# Patient Record
Sex: FEMALE | Race: OTHER | HISPANIC OR LATINO | URBAN - METROPOLITAN AREA
[De-identification: names, ages, dates, MRNs, and addresses within clinical notes are randomized per-mention and may not be internally consistent; named-entity substitution may affect disease eponyms.]

---

## 2021-12-17 ENCOUNTER — EMERGENCY (EMERGENCY)
Facility: HOSPITAL | Age: 66
LOS: 1 days | Discharge: HOME | End: 2021-12-17
Attending: EMERGENCY MEDICINE | Admitting: EMERGENCY MEDICINE
Payer: MEDICARE

## 2021-12-17 VITALS
TEMPERATURE: 97 F | OXYGEN SATURATION: 98 % | RESPIRATION RATE: 18 BRPM | HEIGHT: 61 IN | HEART RATE: 83 BPM | WEIGHT: 197.98 LBS | DIASTOLIC BLOOD PRESSURE: 64 MMHG | SYSTOLIC BLOOD PRESSURE: 117 MMHG

## 2021-12-17 VITALS
SYSTOLIC BLOOD PRESSURE: 106 MMHG | DIASTOLIC BLOOD PRESSURE: 68 MMHG | RESPIRATION RATE: 16 BRPM | HEART RATE: 78 BPM | TEMPERATURE: 97 F | OXYGEN SATURATION: 97 %

## 2021-12-17 DIAGNOSIS — I10 ESSENTIAL (PRIMARY) HYPERTENSION: ICD-10-CM

## 2021-12-17 DIAGNOSIS — Z88.8 ALLERGY STATUS TO OTHER DRUGS, MEDICAMENTS AND BIOLOGICAL SUBSTANCES: ICD-10-CM

## 2021-12-17 DIAGNOSIS — M79.604 PAIN IN RIGHT LEG: ICD-10-CM

## 2021-12-17 DIAGNOSIS — E78.5 HYPERLIPIDEMIA, UNSPECIFIED: ICD-10-CM

## 2021-12-17 DIAGNOSIS — Z79.82 LONG TERM (CURRENT) USE OF ASPIRIN: ICD-10-CM

## 2021-12-17 DIAGNOSIS — Z98.89 OTHER SPECIFIED POSTPROCEDURAL STATES: Chronic | ICD-10-CM

## 2021-12-17 DIAGNOSIS — Z20.822 CONTACT WITH AND (SUSPECTED) EXPOSURE TO COVID-19: ICD-10-CM

## 2021-12-17 DIAGNOSIS — M79.605 PAIN IN LEFT LEG: ICD-10-CM

## 2021-12-17 LAB
ALBUMIN SERPL ELPH-MCNC: 4.4 G/DL — SIGNIFICANT CHANGE UP (ref 3.3–5)
ALBUMIN SERPL ELPH-MCNC: 4.7 G/DL — SIGNIFICANT CHANGE UP (ref 3.3–5)
ALP SERPL-CCNC: 88 U/L — SIGNIFICANT CHANGE UP (ref 40–120)
ALP SERPL-CCNC: 94 U/L — SIGNIFICANT CHANGE UP (ref 40–120)
ALT FLD-CCNC: 16 U/L — SIGNIFICANT CHANGE UP (ref 10–45)
ALT FLD-CCNC: SIGNIFICANT CHANGE UP (ref 10–45)
ANION GAP SERPL CALC-SCNC: 11 MMOL/L — SIGNIFICANT CHANGE UP (ref 5–17)
ANION GAP SERPL CALC-SCNC: 13 MMOL/L — SIGNIFICANT CHANGE UP (ref 5–17)
AST SERPL-CCNC: 17 U/L — SIGNIFICANT CHANGE UP (ref 10–40)
AST SERPL-CCNC: SIGNIFICANT CHANGE UP (ref 10–40)
BASOPHILS # BLD AUTO: 0.03 K/UL — SIGNIFICANT CHANGE UP (ref 0–0.2)
BASOPHILS NFR BLD AUTO: 0.6 % — SIGNIFICANT CHANGE UP (ref 0–2)
BILIRUB SERPL-MCNC: 0.4 MG/DL — SIGNIFICANT CHANGE UP (ref 0.2–1.2)
BILIRUB SERPL-MCNC: 0.4 MG/DL — SIGNIFICANT CHANGE UP (ref 0.2–1.2)
BUN SERPL-MCNC: 14 MG/DL — SIGNIFICANT CHANGE UP (ref 7–23)
BUN SERPL-MCNC: 16 MG/DL — SIGNIFICANT CHANGE UP (ref 7–23)
CALCIUM SERPL-MCNC: 10 MG/DL — SIGNIFICANT CHANGE UP (ref 8.4–10.5)
CALCIUM SERPL-MCNC: 9.8 MG/DL — SIGNIFICANT CHANGE UP (ref 8.4–10.5)
CHLORIDE SERPL-SCNC: 101 MMOL/L — SIGNIFICANT CHANGE UP (ref 96–108)
CHLORIDE SERPL-SCNC: 103 MMOL/L — SIGNIFICANT CHANGE UP (ref 96–108)
CO2 SERPL-SCNC: 26 MMOL/L — SIGNIFICANT CHANGE UP (ref 22–31)
CO2 SERPL-SCNC: 27 MMOL/L — SIGNIFICANT CHANGE UP (ref 22–31)
CREAT SERPL-MCNC: 0.65 MG/DL — SIGNIFICANT CHANGE UP (ref 0.5–1.3)
CREAT SERPL-MCNC: 0.73 MG/DL — SIGNIFICANT CHANGE UP (ref 0.5–1.3)
EOSINOPHIL # BLD AUTO: 0.04 K/UL — SIGNIFICANT CHANGE UP (ref 0–0.5)
EOSINOPHIL NFR BLD AUTO: 0.7 % — SIGNIFICANT CHANGE UP (ref 0–6)
GLUCOSE SERPL-MCNC: 111 MG/DL — HIGH (ref 70–99)
GLUCOSE SERPL-MCNC: 96 MG/DL — SIGNIFICANT CHANGE UP (ref 70–99)
HCT VFR BLD CALC: 37.9 % — SIGNIFICANT CHANGE UP (ref 34.5–45)
HGB BLD-MCNC: 11.9 G/DL — SIGNIFICANT CHANGE UP (ref 11.5–15.5)
IMM GRANULOCYTES NFR BLD AUTO: 0.2 % — SIGNIFICANT CHANGE UP (ref 0–1.5)
LYMPHOCYTES # BLD AUTO: 1.25 K/UL — SIGNIFICANT CHANGE UP (ref 1–3.3)
LYMPHOCYTES # BLD AUTO: 23.3 % — SIGNIFICANT CHANGE UP (ref 13–44)
MAGNESIUM SERPL-MCNC: 2 MG/DL — SIGNIFICANT CHANGE UP (ref 1.6–2.6)
MCHC RBC-ENTMCNC: 29.2 PG — SIGNIFICANT CHANGE UP (ref 27–34)
MCHC RBC-ENTMCNC: 31.4 GM/DL — LOW (ref 32–36)
MCV RBC AUTO: 93.1 FL — SIGNIFICANT CHANGE UP (ref 80–100)
MONOCYTES # BLD AUTO: 0.36 K/UL — SIGNIFICANT CHANGE UP (ref 0–0.9)
MONOCYTES NFR BLD AUTO: 6.7 % — SIGNIFICANT CHANGE UP (ref 2–14)
NEUTROPHILS # BLD AUTO: 3.67 K/UL — SIGNIFICANT CHANGE UP (ref 1.8–7.4)
NEUTROPHILS NFR BLD AUTO: 68.5 % — SIGNIFICANT CHANGE UP (ref 43–77)
NRBC # BLD: 0 /100 WBCS — SIGNIFICANT CHANGE UP (ref 0–0)
PHOSPHATE SERPL-MCNC: 3.7 MG/DL — SIGNIFICANT CHANGE UP (ref 2.5–4.5)
PLATELET # BLD AUTO: 341 K/UL — SIGNIFICANT CHANGE UP (ref 150–400)
POTASSIUM SERPL-MCNC: 3.3 MMOL/L — LOW (ref 3.5–5.3)
POTASSIUM SERPL-MCNC: SIGNIFICANT CHANGE UP (ref 3.5–5.3)
POTASSIUM SERPL-SCNC: 3.3 MMOL/L — LOW (ref 3.5–5.3)
POTASSIUM SERPL-SCNC: SIGNIFICANT CHANGE UP (ref 3.5–5.3)
PROT SERPL-MCNC: 7.7 G/DL — SIGNIFICANT CHANGE UP (ref 6–8.3)
PROT SERPL-MCNC: 8.6 G/DL — HIGH (ref 6–8.3)
RBC # BLD: 4.07 M/UL — SIGNIFICANT CHANGE UP (ref 3.8–5.2)
RBC # FLD: 13.3 % — SIGNIFICANT CHANGE UP (ref 10.3–14.5)
SARS-COV-2 RNA SPEC QL NAA+PROBE: NEGATIVE — SIGNIFICANT CHANGE UP
SODIUM SERPL-SCNC: 140 MMOL/L — SIGNIFICANT CHANGE UP (ref 135–145)
SODIUM SERPL-SCNC: 141 MMOL/L — SIGNIFICANT CHANGE UP (ref 135–145)
WBC # BLD: 5.36 K/UL — SIGNIFICANT CHANGE UP (ref 3.8–10.5)
WBC # FLD AUTO: 5.36 K/UL — SIGNIFICANT CHANGE UP (ref 3.8–10.5)

## 2021-12-17 PROCEDURE — 85025 COMPLETE CBC W/AUTO DIFF WBC: CPT

## 2021-12-17 PROCEDURE — G1004: CPT

## 2021-12-17 PROCEDURE — 84100 ASSAY OF PHOSPHORUS: CPT

## 2021-12-17 PROCEDURE — 93970 EXTREMITY STUDY: CPT | Mod: 26

## 2021-12-17 PROCEDURE — 87635 SARS-COV-2 COVID-19 AMP PRB: CPT

## 2021-12-17 PROCEDURE — 72148 MRI LUMBAR SPINE W/O DYE: CPT | Mod: 26,MG

## 2021-12-17 PROCEDURE — 36415 COLL VENOUS BLD VENIPUNCTURE: CPT

## 2021-12-17 PROCEDURE — 99284 EMERGENCY DEPT VISIT MOD MDM: CPT | Mod: 25

## 2021-12-17 PROCEDURE — 93925 LOWER EXTREMITY STUDY: CPT | Mod: 26

## 2021-12-17 PROCEDURE — 72148 MRI LUMBAR SPINE W/O DYE: CPT | Mod: MG

## 2021-12-17 PROCEDURE — 93925 LOWER EXTREMITY STUDY: CPT

## 2021-12-17 PROCEDURE — 93970 EXTREMITY STUDY: CPT

## 2021-12-17 PROCEDURE — 80053 COMPREHEN METABOLIC PANEL: CPT

## 2021-12-17 PROCEDURE — 99285 EMERGENCY DEPT VISIT HI MDM: CPT

## 2021-12-17 PROCEDURE — 83735 ASSAY OF MAGNESIUM: CPT

## 2021-12-17 RX ORDER — CYCLOBENZAPRINE HYDROCHLORIDE 10 MG/1
10 TABLET, FILM COATED ORAL ONCE
Refills: 0 | Status: COMPLETED | OUTPATIENT
Start: 2021-12-17 | End: 2021-12-17

## 2021-12-17 RX ORDER — CYCLOBENZAPRINE HYDROCHLORIDE 10 MG/1
1 TABLET, FILM COATED ORAL
Qty: 14 | Refills: 0
Start: 2021-12-17

## 2021-12-17 RX ORDER — TRAMADOL HYDROCHLORIDE 50 MG/1
50 TABLET ORAL ONCE
Refills: 0 | Status: DISCONTINUED | OUTPATIENT
Start: 2021-12-17 | End: 2021-12-17

## 2021-12-17 RX ADMIN — CYCLOBENZAPRINE HYDROCHLORIDE 10 MILLIGRAM(S): 10 TABLET, FILM COATED ORAL at 19:30

## 2021-12-17 RX ADMIN — TRAMADOL HYDROCHLORIDE 50 MILLIGRAM(S): 50 TABLET ORAL at 14:34

## 2021-12-17 RX ADMIN — TRAMADOL HYDROCHLORIDE 50 MILLIGRAM(S): 50 TABLET ORAL at 14:40

## 2021-12-17 NOTE — ED ADULT NURSE NOTE - NSIMPLEMENTINTERV_GEN_ALL_ED
Implemented All Fall Risk Interventions:  [place] to call system. Call bell, personal items and telephone within reach. Instruct patient to call for assistance. Room bathroom lighting operational. Non-slip footwear when patient is off stretcher. Physically safe environment: no spills, clutter or unnecessary equipment. Stretcher in lowest position, wheels locked, appropriate side rails in place. Provide visual cue, wrist band, yellow gown, etc. Monitor gait and stability. Monitor for mental status changes and reorient to person, place, and time. Review medications for side effects contributing to fall risk. Reinforce activity limits and safety measures with patient and family.

## 2021-12-17 NOTE — ED PROVIDER NOTE - OBJECTIVE STATEMENT
65 y/o f hx fibromyalgia, b/l iliac stents on plavix/eliquis, chronic back pain, HTN presents c/o b/l leg pain over the past 2 weeks.  Pt stating she goes to an outpatient vascular clinic, had b/l iliac stents placed last year and had been having pain to both legs, was told her stents had narrowed and had a procedure which she describes as a "venogram" on 12/1 during which she was told they placed a balloon to open the stents.  Pt stating since then she has been having pain to left thigh and having pain, numbness and mild weakness to right leg.  Pt stating she followed up with the clinic and told the stents were open, not causing a problem.  Pt has seen her cardiologist subsequently and recommended to come to ED.  Pt reports unchanged chronic low back pain, has been taking tylenol without significnat improvement.  Pt reports she is ambulatory but using a cane.  Denies bowel/bladder incontinence, saddle anesthesia, fever, chills, all other ROS negative.

## 2021-12-17 NOTE — ED PROVIDER NOTE - NEUROLOGICAL, MLM
A&Ox3, strength 4/5 to RLE, sensation grossly intact distally, strength 5/5 to LLE, sensation intact distally.

## 2021-12-17 NOTE — CONSULT NOTE ADULT - SUBJECTIVE AND OBJECTIVE BOX
Orthopaedic Surgery Consult Note    For Surgeon: Dr. Blanco    HPI:  65 y/o f hx fibromyalgia, b/l iliac stents on plavix/eliquis, chronic back pain, HTN presents c/o b/l leg pain over the past 2 weeks.  States she has been having L groin/thigh pain. Pt reports b/l iliac stents placed last year and believes pain may be related. Pt reports unchanged chronic low back pain for which she takes tylenol/cyclobenzaprine. States she follows with an ortho spine surgeon at [facility]. Pt is ambulatory with a cane.  Denies bowel/bladder incontinence, saddle anesthesia, fever, chills, all other ROS negative.    Vital Signs Last 24 Hrs  T(C): 36.3 (17 Dec 2021 19:19), Max: 36.3 (17 Dec 2021 12:24)  T(F): 97.4 (17 Dec 2021 19:19), Max: 97.4 (17 Dec 2021 12:24)  HR: 78 (17 Dec 2021 19:19) (78 - 83)  BP: 106/68 (17 Dec 2021 19:19) (106/68 - 117/64)  BP(mean): --  RR: 16 (17 Dec 2021 19:19) (16 - 18)  SpO2: 97% (17 Dec 2021 19:19) (97% - 98%)    Physical Exam:  General: NAD, resting comfortably in bed  Back:  Skin intact, no ecchymosis; no TTP over spinous processes of vertebra  No saddle anesthesia  Demonstrated ability to ambulate in ED  LLE:  Able to weakly straight leg raise  SILT L1-S2  EHL/FHL/AT/GS 5/5  DP/PT palpable  RLE:  Able to weakly straight leg raise  SILT L1-S2  Iliopsoas 5/5  Quadriceps 5/5  TA 4/5, EHL/FHL/GS 5/5  DP/PT palpable                            11.9   5.36  )-----------( 341      ( 17 Dec 2021 14:24 )             37.9     12-17    141  |  103  |  14  ----------------------------<  96  3.3<L>   |  27  |  0.73    Ca    9.8      17 Dec 2021 19:21  Phos  3.7     12-17  Mg     2.0     12-17    TPro  7.7  /  Alb  4.4  /  TBili  0.4  /  DBili  x   /  AST  17  /  ALT  16  /  AlkPhos  88  12-17    Imaging:  INTERPRETATION:  CLINICAL INDICATION: Back pain, right leg weakness and   numbness.    TECHNIQUE: Multiplanar multisequence MRI of the lumbar spine was   performed without the administration of intravenous contrast, according   to standard protocol.    COMPARISON: None available.    FINDINGS:    ALIGNMENT: There is straightening of the expected lumbar lordosis with   minimal grade 1 anterolisthesis of L4 on L5 and grade 1 anterolisthesis   of L5 on S1.    VERTEBRAE: The vertebral bodies are normal in height. There is no   fracture or aggressive osseous lesion.    DISCS: There is degenerative loss of intervertebral disc space height,   particularly at L5-S1 level.    CONUS MEDULLARIS AND CAUDA EQUINA: The conus medullaris terminates at   L1-2. There is normal appearance of the conus medullaris and cauda equina   nerve roots.    PARAVERTEBRAL SOFT TISSUES AND VISUALIZED RETROPERITONEUM: The visualized   paravertebral soft tissues appear within normal limits.    EVALUATION OF INDIVIDUAL LEVELS:    T12-L1: Disc bulge. No spinal canal or neuroforaminal stenosis.    L1-2: Minimal disc bulge. No spinal canal or neuroforaminal stenosis.    L2-3: Disc bulge. No spinal canal or neuroforaminal stenosis.    L3-4: Disc bulge. No spinal canal or neuroforaminal stenosis.    L4-5: Minimal grade 1 anterolisthesis of L4 on L5. Disc bulge and change   changes of the bilateral facet joints, thickeningof ligamentum flavum   resulting in mild-to-moderate canal stenosis and mild neuroforaminal   narrowing bilaterally. Spinal canal or neuroforaminal stenosis.    L5-S1: Grade 1 anterolisthesis of L5 on S1. There is suggestion of   bilateral spondylolysis at L5, incompletely evaluated on current study.   Disc bulge and change changes of the bilateral facet joints resulting in   mild-to-moderate neuroforaminal narrowing bilaterally. No canal stenosis.    LIMITED EVALUATION OF UPPER SACRUM AND SACROILIAC JOINTS: Unremarkable.    IMPRESSION:    Multilevel degenerative changes of the lower lumbar spine, as above.    A/P: 66F presents with L groin/thigh pain in the setting of chronic lower back pain. MRI findings not concerning for cauda equina syndrome or significant canal/foraminal stenosis.  -Activities as tolerated  -Pain control per ED  -DC with Medrol dose pack  -F/u in office w/ Dr. Blanco    Ortho Pager [phone]

## 2021-12-17 NOTE — ED PROVIDER NOTE - CPE EDP SKIN NORM
[de-identified] : Mediastinum and hilar regions: No thoracic lymphadenopathy. \par \par Cardiovascular: Heart size is normal. No thoracic aortic aneurysm. Severe \par coronary artery calcifications versus stents. \par \par Pericardial effusion: No pericardial effusion. \par \par Chest wall and lower neck: Normal. \par \par Upper abdomen: Normal. \par \par Bones: Normal. \par \par \par IMPRESSION: \par No pulmonary AVM is seen. No PE is seen. \par  [de-identified] : EXAM: MR BRAIN \par PROCEDURE DATE: 06/29/2020 \par \par INTERPRETATION: PROCEDURE: MRI Brain without contrast \par \par INDICATION: Rule out stroke. \par \par TECHNIQUE: Sagittal volumetric T1 with reformatted axial T1 and coronal T1, \par axial T2, FLAIR, DWI, and T2-FFE images were obtained. No contrast was \par given. \par \par COMPARISON: CT head from 6/29/2020. \par \par FINDINGS: \par \par There several punctate foci of increased signal on diffusion-weighted \par imaging with corresponding decreased signal on the apparent diffusion \par coefficient map in the left putamen and external capsule, left corona \par radiata, left parietal subcortical white matter, and a couple in the \par juxtacortical/subcortical white matter of the right frontal lobe, and which \par demonstrate T2 prolongation, consistent with acute/early subacute infarcts. \par \par There is confluent, nonspecific T2 prolongation within the periventricular \par white matter extending into the corona radiata and centrum semiovale with \par discrete foci in the frontal and parietal subcortical white matter \par bilaterally with additional foci in the vicky. Additionally, there are \par multiple lacunar infarcts identified within the centrum semiovale \par bilaterally, corona radiata bilaterally, basal ganglia bilaterally, thalami \par bilaterally, external capsule bilaterally, and the vicky. There appear to be \par lacunar infarcts in the body and splenium of the corpus callosum. Overall, \par findings are consistent with moderate to severe chronic microangiopathic \par disease. \par \par No parenchymal mass or extra-axial fluid collection is seen. There is no \par midline shift or focal mass effect. There are couple of susceptibility foci \par noted in the thalami bilaterally likely hemosiderin related to chronic \par microangiopathic disease (prior hemorrhagic lacunar infarcts) and/or \par hypertension. There is parenchymal volume loss present which is slightly \par greater than expected for patient age. \par \par There is minor mucosal thickening in the ethmoid air cells maxillary antra \par bilaterally. No air-fluid levels are seen in the paranasal sinuses. There is \par minor opacification within the mastoid air cells bilaterally. \par \par IMPRESSION \par \par 1. Acute/early subacute punctate infarcts in left putamen and external \par capsule, left corona radiata, left parietal subcortical white matter, and a \par couple in the juxtacortical/subcortical white matter of the right frontal \par lobe. \par 2. Moderate to severe chronic periventricular, pontine, and subcortical \par microangiopathic disease including multiple bilateral lacunar infarcts in \par the basal ganglia, thalami, vicky, corona radiata, and centrum semiovale. \par 3. Parenchymal volume loss, slightly greater than expected for patient age. \par \par Findings were discussed with Dr. Palacios at 10:45pm on 6/29/20.  normal...

## 2021-12-17 NOTE — ED ADULT NURSE NOTE - NSICDXFAMILYHX_GEN_ALL_CORE_FT
FAMILY HISTORY:  Father  Still living? Unknown  FH: CAD (coronary artery disease), Age at diagnosis: Age Unknown    Mother  Still living? Unknown  FH: CAD (coronary artery disease), Age at diagnosis: Age Unknown    Grandparent  Still living? Unknown  FH: MI (myocardial infarction), Age at diagnosis: Age Unknown

## 2021-12-17 NOTE — ED ADULT NURSE NOTE - OBJECTIVE STATEMENT
Pt presents to ED c/o L groin surgical site pain. Surgery was 12/1/21. Pt referred to ED from PCP after appointment yesterday for increased pain. Sensory and circulation intact in both lower extremities. Denies fever and chills.

## 2021-12-17 NOTE — ED PROVIDER NOTE - PATIENT PORTAL LINK FT
You can access the FollowMyHealth Patient Portal offered by [facility] by registering at the following website: [email or web address]. By joining [employer]’s FollowMyHealth portal, you will also be able to view your health information using other applications (apps) compatible with our system.

## 2021-12-17 NOTE — ED PROVIDER NOTE - CARE PROVIDER_API CALL
Shorty Barraza)  Orthopaedic Surgery  [address] [place] [address]  Phone: [phone]  Fax: [phone]  Follow Up Time:    Shorty Barraza)  Orthopaedic Surgery  [address], [address] [place] [address]  Phone: [phone]  Fax: [phone]  Follow Up Time:     Sourav Blanco)  Orthopedics  [address] [address]  Phone: [phone]  Fax: [phone]  Follow Up Time:

## 2021-12-17 NOTE — ED PROVIDER NOTE - CLINICAL SUMMARY MEDICAL DECISION MAKING FREE TEXT BOX
67 y/o f hx fibromyalgia, b/l iliac stents on plavix/eliquis, chronic back pain, HTN presents c/o b/l leg pain over the past 2 weeks.  VSS in ED, pt ambulatory, has 4/5 strength to RLE possibly 2/2 pain.  Pedal pulses are palpable, no discoloration to LE, has seen her vascular clinic and told the stents she has are patent.  U/s duplex b/l LE wnl, u/s arterial b/l LE neg.  MRI lumbar done to r/o spinal etiology of right leg numbness/weakness.  Pt given pain meds, will f/u MRI and reassess for improvement.

## 2021-12-17 NOTE — ED PROVIDER NOTE - ATTENDING CONTRIBUTION TO CARE
I discussed the plan of care of the patient directly with the PA while the patient was in the Emergency Department. I did not perform a face to face diagnostic evaluation on this patient. I have reviewed the ACP note and agree with the history, exam and plan of care.    Pt is a 67 yo f, h/o fibromyalgia, b/l iliac stents on plavix/eliquis, chronic back pain, HTN who p/w b/l leg pains, with numbness and weakness to rle x 2 weeks.  Seen by vascular and told that stents were patent. Pt stating she followed up with the clinic and told the stents were open. No bowel/ bladder dysfunction, saddle anesthesia. No f/c, urinary sx's, flank pain. No abd pain. Pt ambulatory w cane. PE as per PA note. Plan for mr lumbar spine to r/o cauda equina, as well as b/l lower ext dopplers and arterial duplex given h/o b/l iliac stents. Dispo pending imaging results and pt re-evaluation. Pt s/o to Dr. Bartlett.

## 2021-12-17 NOTE — ED ADULT TRIAGE NOTE - CHIEF COMPLAINT QUOTE
Pt presents co R groin pain, ileac vein stent placement on 12/1 and reports pain has worsened since the sx. Pt reports numbness and decreased ability to bear weight on the leg since 12/1. Ambulatory with cane.

## 2021-12-17 NOTE — ED ADULT NURSE NOTE - NSICDXPASTMEDICALHX_GEN_ALL_CORE_FT
PAST MEDICAL HISTORY:  Dyspnea     Ectopic pregnancy     Fibromyalgia     History of herniated intervertebral disc     HLD (hyperlipidemia)     HTN (hypertension)     Insomnia     Lock jaw     Migraine headache     Restless legs syndrome     Swelling of lower extremity     Syncope and collapse

## 2021-12-17 NOTE — ED PROVIDER NOTE - MUSCULOSKELETAL, MLM
Spine appears normal, no midline tenderness, no joint tenderness to lower ext.  DP/PT 2+ b/l, no discoloration to lower ext

## 2023-05-17 ENCOUNTER — EMERGENCY (EMERGENCY)
Facility: HOSPITAL | Age: 68
LOS: 1 days | Discharge: HOME | End: 2023-05-17
Attending: EMERGENCY MEDICINE | Admitting: EMERGENCY MEDICINE
Payer: MEDICARE

## 2023-05-17 VITALS
RESPIRATION RATE: 18 BRPM | HEART RATE: 71 BPM | DIASTOLIC BLOOD PRESSURE: 60 MMHG | TEMPERATURE: 98 F | SYSTOLIC BLOOD PRESSURE: 108 MMHG | OXYGEN SATURATION: 99 %

## 2023-05-17 VITALS
SYSTOLIC BLOOD PRESSURE: 105 MMHG | TEMPERATURE: 98 F | HEART RATE: 82 BPM | WEIGHT: 182.98 LBS | OXYGEN SATURATION: 98 % | DIASTOLIC BLOOD PRESSURE: 57 MMHG | RESPIRATION RATE: 18 BRPM | HEIGHT: 61 IN

## 2023-05-17 DIAGNOSIS — Z98.89 OTHER SPECIFIED POSTPROCEDURAL STATES: Chronic | ICD-10-CM

## 2023-05-17 PROCEDURE — 71046 X-RAY EXAM CHEST 2 VIEWS: CPT

## 2023-05-17 PROCEDURE — 99283 EMERGENCY DEPT VISIT LOW MDM: CPT | Mod: 25

## 2023-05-17 PROCEDURE — 71046 X-RAY EXAM CHEST 2 VIEWS: CPT | Mod: 26

## 2023-05-17 PROCEDURE — 99284 EMERGENCY DEPT VISIT MOD MDM: CPT

## 2023-05-17 RX ORDER — HYDROCODONE BITARTRATE AND HOMATROPINE METHYLBROMIDE 5; 1.5 MG/5ML; MG/5ML
5 SOLUTION ORAL
Qty: 75 | Refills: 0
Start: 2023-05-17

## 2023-05-17 RX ORDER — PROMETHAZINE HCL/CODEINE
5 SYRUP ORAL
Qty: 75 | Refills: 0
Start: 2023-05-17

## 2023-05-17 NOTE — ED PROVIDER NOTE - OBJECTIVE STATEMENT
66 y/o f hx b/l iliac stents on eliquis/plavix, fibromyalgia presents c/o persistent productive cough for the past 2 weeks.  Pt stating she went to urgent care for this 5 days ago, was put on abx which finished today.  Pt stating the cough is unchanged.  Pt also has been having right leg swelling over the past 2 weeks.  Pt reports intermittent swelling since the stents were placed in 2017, stating usually it improves with elevation but this time it has been persistent.  Pt had u/s done 2 days ago which showed no blood clot or acute abnormality.  Denies fever, CP, numbness/tingling to ext, weakness, all other ROS negative.

## 2023-05-17 NOTE — ED PROVIDER NOTE - ATTENDING APP SHARED VISIT CONTRIBUTION OF CARE
Vitals wnl, exam as above.  Pt had duplex 2d ago w/ no DVT, is on eliquis and has hx of similar intermittent LE swelling. Clinically no acute DVT.  CXR wnl. Pt also completed abx - clinically not bacterial PNA. Possible URI vs. GERD.   Discussed importance of outpt follow up and return precautions. Clinically no indication for further emergent ED workup or hospitalization at this time. Stable for dc, outpt f/u.

## 2023-05-17 NOTE — ED PROVIDER NOTE - PROGRESS NOTE DETAILS
cxr neg, will rx cough medicine, pt will f/u with her vascular surgeon, to return to ED if sx worsen.

## 2023-05-17 NOTE — ED PROVIDER NOTE - NS ED ATTENDING STATEMENT MOD
This was a shared visit with the SILVERIO. I reviewed and verified the documentation and independently performed the documented:

## 2023-05-17 NOTE — ED ADULT NURSE NOTE - NSFALLUNIVINTERV_ED_ALL_ED
Bed/Stretcher in lowest position, wheels locked, appropriate side rails in place/Call bell, personal items and telephone in reach/Instruct patient to call for assistance before getting out of bed/chair/stretcher/Non-slip footwear applied when patient is off stretcher/[place] to call system/Physically safe environment - no spills, clutter or unnecessary equipment/Purposeful proactive rounding/Room/bathroom lighting operational, light cord in reach

## 2023-05-17 NOTE — ED PROVIDER NOTE - NSFOLLOWUPINSTRUCTIONS_ED_ALL_ED_FT
Cough, Adult  A cough helps to clear your throat and lungs. A cough may be a sign of an illness or another medical condition.    An acute cough may only last 2–3 weeks, while a chronic cough may last 8 or more weeks.    Many things can cause a cough. They include:  Germs (viruses or bacteria) that attack the airway.  Breathing in things that bother (irritate) your lungs.  Allergies.  Asthma.  Mucus that runs down the back of your throat (postnasal drip).  Smoking.  Acid backing up from the stomach into the tube that moves food from the mouth to the stomach (gastroesophageal reflux).  Some medicines.  Lung problems.  Other medical conditions, such as heart failure or a blood clot in the lung (pulmonary embolism).  Follow these instructions at home:  Medicines    Take over-the-counter and prescription medicines only as told by your doctor.  Talk with your doctor before you take medicines that stop a cough (cough suppressants).  Lifestyle      Do not smoke, and try not to be around smoke. Do not use any products that contain nicotine or tobacco, such as cigarettes, e-cigarettes, and chewing tobacco. If you need help quitting, ask your doctor.  Drink enough fluid to keep your pee (urine) pale yellow.  Avoid caffeine.  Do not drink alcohol if your doctor tells you not to drink.  General instructions      Watch for any changes in your cough. Tell your doctor about them.  Always cover your mouth when you cough.  Stay away from things that make you cough, such as perfume, candles, campfire smoke, or cleaning products.  If the air is dry, use a cool mist vaporizer or humidifier in your home.  If your cough is worse at night, try using extra pillows to raise your head up higher while you sleep.  Rest as needed.  Keep all follow-up visits as told by your doctor. This is important.  Contact a doctor if:  You have new symptoms.  You cough up pus.  Your cough does not get better after 2–3 weeks, or your cough gets worse.  Cough medicine does not help your cough and you are not sleeping well.  You have pain that gets worse or pain that is not helped with medicine.  You have a fever.  You are losing weight and you do not know why.  You have night sweats.  Get help right away if:  You cough up blood.  You have trouble breathing.  Your heartbeat is very fast.  These symptoms may be an emergency. Do not wait to see if the symptoms will go away. Get medical help right away. Call your local emergency services (911 in the [place] Do not drive yourself to the hospital.    Summary  A cough helps to clear your throat and lungs. Many things can cause a cough.  Take over-the-counter and prescription medicines only as told by your doctor.  Always cover your mouth when you cough.  Contact a doctor if you have new symptoms or you have a cough that does not get better or gets worse.  This information is not intended to replace advice given to you by your health care provider. Make sure you discuss any questions you have with your health care provider.

## 2023-05-17 NOTE — ED ADULT NURSE NOTE - OBJECTIVE STATEMENT
67 F / Pmhx HTN, Asthma ,Iliac stent ambulates to ED c/o cough x 2 weeks. Patient denies sob/ cp/ fever.

## 2023-05-17 NOTE — ED PROVIDER NOTE - PATIENT PORTAL LINK FT
You can access the FollowMyHealth Patient Portal offered by [facility] by registering at the following website: [email or web address]. By joining [employer]’s FollowMyHealth portal, you will also be able to view your health information using other applications (apps) compatible with our system.

## 2023-05-17 NOTE — ED PROVIDER NOTE - CLINICAL SUMMARY MEDICAL DECISION MAKING FREE TEXT BOX
68 y/o f hx fibromyalgia, b/l iliac stents on eliquis/plavix presents c/o productive cough x 2 weeks, no improvement with course of abx.  Pt also with atraumatic right leg swelling over the past 2 weeks.  Pt afebrile in ED, clear lungs, no resp distress, will obtain cxr.  Right leg swelling for the past 2 weeks, has good pedal pulses, not concerned for arterial blockage or infectious etiology.  Pt had neg u/s duplex 2 days ago, has report with her, no indication for repeat.  Will recommend she wear compression socks, f/u with her vascular doctor, return to ED if sx worsen.

## 2023-05-19 DIAGNOSIS — Z88.8 ALLERGY STATUS TO OTHER DRUGS, MEDICAMENTS AND BIOLOGICAL SUBSTANCES: ICD-10-CM

## 2023-05-19 DIAGNOSIS — R05.9 COUGH, UNSPECIFIED: ICD-10-CM

## 2023-05-19 DIAGNOSIS — Z79.82 LONG TERM (CURRENT) USE OF ASPIRIN: ICD-10-CM

## 2023-05-19 DIAGNOSIS — Z95.820 PERIPHERAL VASCULAR ANGIOPLASTY STATUS WITH IMPLANTS AND GRAFTS: ICD-10-CM

## 2023-05-19 DIAGNOSIS — E78.5 HYPERLIPIDEMIA, UNSPECIFIED: ICD-10-CM

## 2023-05-19 DIAGNOSIS — Z79.01 LONG TERM (CURRENT) USE OF ANTICOAGULANTS: ICD-10-CM

## 2023-05-19 DIAGNOSIS — Z79.02 LONG TERM (CURRENT) USE OF ANTITHROMBOTICS/ANTIPLATELETS: ICD-10-CM

## 2023-05-19 DIAGNOSIS — I10 ESSENTIAL (PRIMARY) HYPERTENSION: ICD-10-CM

## 2023-05-19 DIAGNOSIS — G43.909 MIGRAINE, UNSPECIFIED, NOT INTRACTABLE, WITHOUT STATUS MIGRAINOSUS: ICD-10-CM

## 2023-05-19 DIAGNOSIS — Z87.39 PERSONAL HISTORY OF OTHER DISEASES OF THE MUSCULOSKELETAL SYSTEM AND CONNECTIVE TISSUE: ICD-10-CM

## 2023-05-19 DIAGNOSIS — M79.89 OTHER SPECIFIED SOFT TISSUE DISORDERS: ICD-10-CM

## 2024-08-08 NOTE — ED PROVIDER NOTE - NSICDXPASTMEDICALHX_GEN_ALL_CORE_FT
Update History & Physical    The patient's History and Physical from last week in my office was reviewed with the patient and I examined the patient. There was no change. The surgical site was confirmed by the patient and me.       Plan: The risks, benefits, expected outcome, and alternative to the recommended procedure have been discussed with the patient. Patient understands and wants to proceed with the procedure.     Electronically signed by Mona Spence MD on 8/8/2024 at 10:17 AM        
PAST MEDICAL HISTORY:  Dyspnea     Ectopic pregnancy     Fibromyalgia     History of herniated intervertebral disc     HLD (hyperlipidemia)     HTN (hypertension)     Insomnia     Lock jaw     Migraine headache     Restless legs syndrome     Swelling of lower extremity     Syncope and collapse